# Patient Record
Sex: MALE | Race: WHITE | NOT HISPANIC OR LATINO | Employment: FULL TIME | URBAN - NONMETROPOLITAN AREA
[De-identification: names, ages, dates, MRNs, and addresses within clinical notes are randomized per-mention and may not be internally consistent; named-entity substitution may affect disease eponyms.]

---

## 2021-09-09 ENCOUNTER — HOSPITAL ENCOUNTER (EMERGENCY)
Facility: HOSPITAL | Age: 46
Discharge: HOME | End: 2021-09-09
Attending: EMERGENCY MEDICINE | Admitting: EMERGENCY MEDICINE

## 2021-09-09 ENCOUNTER — APPOINTMENT (OUTPATIENT)
Dept: GENERAL RADIOLOGY | Facility: HOSPITAL | Age: 46
End: 2021-09-09

## 2021-09-09 VITALS
BODY MASS INDEX: 31.75 KG/M2 | HEART RATE: 102 BPM | HEIGHT: 72 IN | RESPIRATION RATE: 18 BRPM | SYSTOLIC BLOOD PRESSURE: 147 MMHG | OXYGEN SATURATION: 98 % | DIASTOLIC BLOOD PRESSURE: 101 MMHG | WEIGHT: 234.4 LBS | TEMPERATURE: 98.3 F
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
## 2021-09-09 DIAGNOSIS — M25.512 ACUTE SHOULDER PAIN DUE TO TRAUMA, LEFT: Primary | ICD-10-CM

## 2021-09-09 DIAGNOSIS — G89.11 ACUTE SHOULDER PAIN DUE TO TRAUMA, LEFT: Primary | ICD-10-CM

## 2021-09-09 PROCEDURE — 99283 EMERGENCY DEPT VISIT LOW MDM: CPT

## 2021-09-09 PROCEDURE — 73030 X-RAY EXAM OF SHOULDER: CPT

## 2021-09-09 RX ORDER — CYCLOBENZAPRINE HCL 5 MG
5 TABLET ORAL 3 TIMES DAILY PRN
Qty: 12 TABLET | Refills: 0 | Status: SHIPPED | OUTPATIENT
Start: 2021-09-09

## 2021-09-09 RX ORDER — CYCLOBENZAPRINE HCL 10 MG
10 TABLET ORAL ONCE
Status: COMPLETED | OUTPATIENT
Start: 2021-09-09 | End: 2021-09-09

## 2021-09-09 RX ORDER — OMEPRAZOLE 40 MG/1
CAPSULE, DELAYED RELEASE ORAL DAILY
COMMUNITY

## 2021-09-09 RX ORDER — TAMSULOSIN HYDROCHLORIDE 0.4 MG/1
1 CAPSULE ORAL DAILY
COMMUNITY

## 2021-09-09 RX ADMIN — CYCLOBENZAPRINE HYDROCHLORIDE 10 MG: 10 TABLET, FILM COATED ORAL at 23:42

## 2021-09-10 NOTE — DISCHARGE INSTRUCTIONS
Take Tylenol and Motrin as needed per directions on package. Take Flexeril as prescribed as needed. Follow up with Orthopedics. Return to the ER for new/worsening symptoms.

## 2021-09-10 NOTE — ED PROVIDER NOTES
Subjective   History of Present Illness   Patient is a 45-year-old male presenting to the ER with symptoms of left shoulder pain after a fall today he tripped over steps on his porch and fell onto his left shoulder.  He denies head trauma and loss of consciousness.  He denies additional injuries or complaints at this time.  He states that the pain is worse when he is sitting down and he states he is having difficulty with range of motion.  He states he is concerned he will have difficulty working as he is left-handed and has to lift a lot at work.    Review of Systems   Musculoskeletal:        Left shoulder pain secondary to fall   All other systems reviewed and are negative.      Past Medical History:   Diagnosis Date   • GERD (gastroesophageal reflux disease)    • Hiatal hernia    • Prostate disease        Allergies   Allergen Reactions   • Phenergan [Promethazine Hcl] Seizure       History reviewed. No pertinent surgical history.    History reviewed. No pertinent family history.    Social History     Socioeconomic History   • Marital status:      Spouse name: Not on file   • Number of children: Not on file   • Years of education: Not on file   • Highest education level: Not on file           Objective   Physical Exam  Vitals and nursing note reviewed.   Constitutional:       General: He is not in acute distress.     Appearance: He is not toxic-appearing.   HENT:      Head: Normocephalic and atraumatic.      Right Ear: External ear normal.      Left Ear: External ear normal.      Nose: Nose normal.   Eyes:      Extraocular Movements: Extraocular movements intact.      Conjunctiva/sclera: Conjunctivae normal.   Cardiovascular:      Rate and Rhythm: Normal rate.      Heart sounds: Normal heart sounds. No murmur heard.   No friction rub. No gallop.    Pulmonary:      Effort: Pulmonary effort is normal.      Breath sounds: Normal breath sounds. No wheezing.   Abdominal:      Palpations: Abdomen is soft.       Tenderness: There is no abdominal tenderness.   Musculoskeletal:      Cervical back: Normal range of motion and neck supple.      Comments: Decreased range of motion of left upper extremity, neurovascularly intact, tenderness to palpation of the left shoulder   Skin:     General: Skin is warm and dry.   Neurological:      General: No focal deficit present.      Mental Status: He is alert and oriented to person, place, and time.   Psychiatric:         Mood and Affect: Mood normal.         Behavior: Behavior normal.         Procedures           ED Course                                           MDM   Patient had Excedrin and ibuprofen prior to arrival.  Vitals are within normal limits.  Exam remarkable for decreased range of motion and tenderness to palpation of left shoulder.  X-ray performed and reviewed by myself and ED attending.  No obvious fracture or dislocation.  Patient was given 10 mg p.o. Flexeril.  He was given a sling for immobilization.  Prescription for Flexeril given.  Follow-up information for orthopedics for definitive care was given.  Precautions were given for return to the ER for any new or worsening symptoms.    Final diagnoses:   Acute shoulder pain due to trauma, left   Fall, initial encounter       ED Disposition  ED Disposition     ED Disposition Condition Comment    Discharge Stable           Provider, No Known  [facility] [id number] [phone]    Call   As needed    [facility] Emergency Department  [address] [address] [phone]  Go to   As needed, If symptoms worsen    Harlan Bangura MD  [address]  [address]  [facility] [place] [address] [phone]    Schedule an appointment as soon as possible for a visit   for re-check of today's complaint         Medication List      New Prescriptions    cyclobenzaprine 5 MG tablet  Commonly known as: FLEXERIL  Take 1 tablet by mouth 3 (Three) Times a Day As Needed for Muscle  Spasms.           Where to Get Your Medications      These medications were sent to [facility] [address] [place] [address] - [address] [place] [address] - [phone]  - [phone] [address] [address]    Phone: [phone]   · cyclobenzaprine 5 MG tablet          Marjorie Hdez PA-C  09/09/21 [id number]

## 2022-01-28 ENCOUNTER — OFFICE VISIT (OUTPATIENT)
Dept: UROLOGY | Facility: CLINIC | Age: 47
End: 2022-01-28

## 2022-01-28 VITALS
RESPIRATION RATE: 16 BRPM | OXYGEN SATURATION: 97 % | WEIGHT: 234 LBS | SYSTOLIC BLOOD PRESSURE: 108 MMHG | HEIGHT: 72 IN | DIASTOLIC BLOOD PRESSURE: 62 MMHG | TEMPERATURE: 97.8 F | BODY MASS INDEX: 31.69 KG/M2 | HEART RATE: 86 BPM

## 2022-01-28 DIAGNOSIS — R39.9 LOWER URINARY TRACT SYMPTOMS (LUTS): ICD-10-CM

## 2022-01-28 DIAGNOSIS — N40.1 BENIGN PROSTATIC HYPERPLASIA WITH LOWER URINARY TRACT SYMPTOMS, SYMPTOM DETAILS UNSPECIFIED: Primary | ICD-10-CM

## 2022-01-28 LAB
BILIRUB BLD-MCNC: NEGATIVE MG/DL
CLARITY, POC: CLEAR
COLOR UR: YELLOW
EXPIRATION DATE: ABNORMAL
GLUCOSE UR STRIP-MCNC: NEGATIVE MG/DL
KETONES UR QL: NEGATIVE
LEUKOCYTE EST, POC: NEGATIVE
Lab: ABNORMAL
NITRITE UR-MCNC: NEGATIVE MG/ML
PH UR: 6.5 [PH] (ref 5–8)
PROT UR STRIP-MCNC: ABNORMAL MG/DL
RBC # UR STRIP: ABNORMAL /UL
SP GR UR: 1.01 (ref 1–1.03)
UROBILINOGEN UR QL: ABNORMAL

## 2022-01-28 PROCEDURE — 81003 URINALYSIS AUTO W/O SCOPE: CPT | Performed by: UROLOGY

## 2022-01-28 PROCEDURE — 99204 OFFICE O/P NEW MOD 45 MIN: CPT | Performed by: UROLOGY

## 2022-01-28 RX ORDER — ACETAMINOPHEN 500 MG/1
TABLET ORAL
COMMUNITY
Start: 2021-10-22

## 2022-01-28 RX ORDER — IBUPROFEN 800 MG/1
TABLET ORAL
COMMUNITY
Start: 2021-10-22

## 2022-01-28 NOTE — PROGRESS NOTES
"Chief Complaint  LUTS    Referring Provider  Kiley Tinoco, NP-C     HPI  Mr. Lopez is a 46 y.o. male with history below who presents with LUTS.  Primary symptom includes: Weak stream, nocturia, intermittency  Patient denies dysuria or hematuria.  Onset was 5 years ago.    Previous treatments include: flomax; has been doubling his dose because he is very bothered by Sx.   IPSS is 23.  Scanned in chart    Pt reports vein on right scrotum that bothers him    Past Medical History  Past Medical History:   Diagnosis Date   • GERD (gastroesophageal reflux disease)    • Hiatal hernia    • Prostate disease        Past Surgical History  History reviewed. No pertinent surgical history.    Medications    Current Outpatient Medications:   •  EQ Pain Reliever 500 MG tablet, , Disp: , Rfl:   •  ibuprofen (ADVIL,MOTRIN) 800 MG tablet, , Disp: , Rfl:   •  omeprazole (priLOSEC) 40 MG capsule, Take  by mouth Daily., Disp: , Rfl:   •  tamsulosin (FLOMAX) 0.4 MG capsule 24 hr capsule, Take 1 capsule by mouth Daily., Disp: , Rfl:   •  cyclobenzaprine (FLEXERIL) 5 MG tablet, Take 1 tablet by mouth 3 (Three) Times a Day As Needed for Muscle Spasms., Disp: 12 tablet, Rfl: 0    Allergies  Allergies   Allergen Reactions   • Phenergan [Promethazine Hcl] Seizure       Social History  Social History     Socioeconomic History   • Marital status:        Family History  He has no family history of prostate cancer  History reviewed. No pertinent family history.    Physical Exam  Visit Vitals  /62   Pulse 86   Temp 97.8 °F (36.6 °C)   Resp 16   Ht 182.9 cm (72\")   Wt 106 kg (234 lb)   SpO2 97%   BMI 31.74 kg/m²     Physical exam notable for normal habitus.    Genitourinary  Deferred    Labs  No results found for: PSA    Brief Urine Lab Results  (Last result in the past 365 days)      Color   Clarity   Blood   Leuk Est   Nitrite   Protein   CREAT   Urine HCG        01/28/22 1022 Yellow   Clear   Trace   Negative   Negative   Trace    "            PSA 1.0 10/15/21 at [facility]    PVR  Post-void residual performed by staff - 87 mL      Assessment  Mr. Lopez is a 46 y.o. male who presents with worsening of chronic LUTS, primarily weak stream, intermittency, despite alpha blocker.    Plan  1.  Follow up for cystoscopy, TRUS, Uroflow, ISATU, GE   2.  Continue tamsulosin for now.  I recommended against increasing it to twice daily    Tito Owens MD

## 2022-02-21 ENCOUNTER — OFFICE VISIT (OUTPATIENT)
Dept: UROLOGY | Facility: CLINIC | Age: 47
End: 2022-02-21

## 2022-02-21 DIAGNOSIS — R39.9 LOWER URINARY TRACT SYMPTOMS (LUTS): Primary | ICD-10-CM

## 2022-02-21 PROCEDURE — 51741 ELECTRO-UROFLOWMETRY FIRST: CPT | Performed by: UROLOGY

## 2022-02-21 PROCEDURE — 52000 CYSTOURETHROSCOPY: CPT | Performed by: UROLOGY

## 2022-02-21 PROCEDURE — 76872 US TRANSRECTAL: CPT | Performed by: UROLOGY

## 2022-02-21 RX ORDER — ACETAMINOPHEN 325 MG/1
650 TABLET ORAL EVERY 6 HOURS
Qty: 32 TABLET | Refills: 0 | Status: SHIPPED | OUTPATIENT
Start: 2022-02-21 | End: 2022-02-25

## 2022-02-21 RX ORDER — PHENAZOPYRIDINE HYDROCHLORIDE 100 MG/1
100 TABLET, FILM COATED ORAL 3 TIMES DAILY PRN
Qty: 30 TABLET | Refills: 0 | Status: SHIPPED | OUTPATIENT
Start: 2022-02-21 | End: 2022-02-24

## 2022-02-21 RX ORDER — SULFAMETHOXAZOLE AND TRIMETHOPRIM 800; 160 MG/1; MG/1
1 TABLET ORAL 2 TIMES DAILY
Qty: 6 TABLET | Refills: 0 | Status: SHIPPED | OUTPATIENT
Start: 2022-02-21

## 2022-02-21 NOTE — PROGRESS NOTES
Chief Complaint   Patient presents with   • Cystoscopy with TRUS and Uroflow     100mg Macrodantin given prior to procedure        HPI  Ms. Lopez is a 46 y.o. male with history below in assessment, who presents for follow up.     At this visit still bothered by weak stream and nocturia    Past Medical History:   Diagnosis Date   • GERD (gastroesophageal reflux disease)    • Hiatal hernia    • Prostate disease        No past surgical history on file.      Current Outpatient Medications:   •  cyclobenzaprine (FLEXERIL) 5 MG tablet, Take 1 tablet by mouth 3 (Three) Times a Day As Needed for Muscle Spasms., Disp: 12 tablet, Rfl: 0  •  EQ Pain Reliever 500 MG tablet, , Disp: , Rfl:   •  ibuprofen (ADVIL,MOTRIN) 800 MG tablet, , Disp: , Rfl:   •  omeprazole (priLOSEC) 40 MG capsule, Take  by mouth Daily., Disp: , Rfl:   •  tamsulosin (FLOMAX) 0.4 MG capsule 24 hr capsule, Take 1 capsule by mouth Daily., Disp: , Rfl:      Physical Exam  There were no vitals taken for this visit.    Labs  Brief Urine Lab Results  (Last result in the past 365 days)      Color   Clarity   Blood   Leuk Est   Nitrite   Protein   CREAT   Urine HCG        01/28/22 1022 Yellow   Clear   Trace   Negative   Negative   Trace                 No results found for: GLUCOSE, CALCIUM, NA, K, CO2, CL, BUN, CREATININE, EGFRIFAFRI, EGFRIFNONA, BCR, ANIONGAP    No results found for: WBC, HGB, HCT, MCV, PLT     No results found for: PSA        Radiographic Studies  No Images in the past 120 days found..    Preprocedure diagnosis  LUTS    Postprocedure diagnosis  LUTS    Procedure  Flexible Cystourethroscopy    Attending surgeon  Tito Owens MD    Anesthesia  2% lidocaine jelly intraurethrally    Complications  None    Indications  46 y.o. male undergoing a flexible cystoscopy for the above mentioned indications.  Informed consent was obtained.      Findings  Cystoscopic findings included one right and left ureteral orifice in the normal anatomic  position with normal bladder mucosa and no tumors, masses or stones. The urethral urothelium was within normal limits with no strictures.  There was not a prominent median lobe.  The lateral lobes were short and obstructive in appearance.      Procedure  The patient was placed in supine position and prepped and draped in sterile fashion with lidocaine jelly per urethra for anesthesia.  A timeout was performed.  The 14F flexible cystoscope was lubricated and gently placed through the penile urethra and into the bladder.  The bladder was completely visualized.  The cystoscope was retroflexed and the bladder neck and prostate visualized.  The cystoscope was slowly withdrawn while visualizing the urethra and the procedure terminated.  The patient tolerated the procedure well.      TRUS OF PROSTATE    Preoperative diagnosis  LUTS    Postoperative diagnosis  Same    Procedure  1.  Transrectal ultrasound of the prostate    Attending Surgeon  Tito Owens MD    Anesthesia  2% lidocaine jelly, intrarectal instillation, 10mL    Complications  None    Specimen  None    Indications  Mr. Lopez is a 46 y.o. male with LUTS.  He presents for prostate ultrasound to evaluate prostate size.    Procedure  The patient was positioned and prepped in a left lateral position with lower extremities flexed.  Lidocaine jelly, 2%, was injected per rectum. A digital rectal exam was performed which demonstrated a smooth prostate without nodules or induration. The [employer] E8CS rectal ultrasound probe was slowly introduced into the rectum without difficulty.  The prostate and seminal vesicles were inspected systematically using cross and sagittal views with the ultrasound. A median lobe was not seen.  The dimensions of the prostate were measured, for a calculated volume of 31 mL.  The seminal vesicles appeared normal.  The rectal ultrasound probe was removed.  The patient tolerated the procedure well.    Uroflow:  Peak flow rate - 10.1  mL/sec  Average flow rate - 4 mL/sec  Flow curve - low hump  Voided volume - 120 mL    I personally reviewed  and interpreted this study.     I have reviewed above labs and imaging.     Assessment  46 y.o. male with worsening of chronic LUTS, primarily weak stream, intermittency, despite alpha blocker.    In a separate room in separate encounter after his cystoscopy we discussed different treatment options for his chronic lower urinary tract symptoms.  He has already failed medications.  He desires the least invasive procedure possible.  We discussed the risk, benefits, and alternatives to UroLift.  Informed consent was obtained.    Plan  1. Schedule Urolift at [facility].   2. Rx sent in    I spent a total of 30 minutes with the patient and the chart engaging in data gathering and interpretation, patient interaction, as well as counseling on the risks, benefits, and alternatives of the therapy and coordinating care.

## 2022-03-16 ENCOUNTER — TELEPHONE (OUTPATIENT)
Dept: UROLOGY | Facility: CLINIC | Age: 47
End: 2022-03-16

## 2022-03-16 DIAGNOSIS — R39.9 LOWER URINARY TRACT SYMPTOMS (LUTS): Primary | ICD-10-CM

## 2022-03-16 RX ORDER — OXYBUTYNIN CHLORIDE 10 MG/1
10 TABLET, EXTENDED RELEASE ORAL DAILY
Qty: 30 TABLET | Refills: 0 | Status: SHIPPED | OUTPATIENT
Start: 2022-03-16

## 2022-03-16 NOTE — TELEPHONE ENCOUNTER
Please call and tell him that he is already on oxybutynin from my PA Ms. Aparicio, which does the same thing

## 2022-03-16 NOTE — TELEPHONE ENCOUNTER
Patient stated he was suppose to have a script for Myrbetriq, but when he went to the pharmacy to pick it up, he was told that they didn't have the script for the patient

## 2023-12-01 ENCOUNTER — OFFICE VISIT (OUTPATIENT)
Dept: GASTROENTEROLOGY | Facility: CLINIC | Age: 48
End: 2023-12-01
Payer: COMMERCIAL

## 2023-12-01 VITALS
SYSTOLIC BLOOD PRESSURE: 120 MMHG | HEART RATE: 92 BPM | WEIGHT: 247 LBS | BODY MASS INDEX: 33.5 KG/M2 | DIASTOLIC BLOOD PRESSURE: 80 MMHG | OXYGEN SATURATION: 98 %

## 2023-12-01 DIAGNOSIS — R13.10 DYSPHAGIA, UNSPECIFIED TYPE: Primary | ICD-10-CM

## 2023-12-01 DIAGNOSIS — R09.89 CHOKING SENSATION: ICD-10-CM

## 2023-12-01 DIAGNOSIS — K21.9 GASTROESOPHAGEAL REFLUX DISEASE, UNSPECIFIED WHETHER ESOPHAGITIS PRESENT: ICD-10-CM

## 2023-12-01 DIAGNOSIS — Z12.11 COLON CANCER SCREENING: ICD-10-CM

## 2023-12-01 DIAGNOSIS — K44.9 HIATAL HERNIA: ICD-10-CM

## 2023-12-01 RX ORDER — CYCLOBENZAPRINE HCL 5 MG
5 TABLET ORAL 3 TIMES DAILY PRN
COMMUNITY

## 2023-12-01 RX ORDER — LISINOPRIL 2.5 MG/1
2.5 TABLET ORAL DAILY
COMMUNITY

## 2023-12-01 RX ORDER — SODIUM, POTASSIUM,MAG SULFATES 17.5-3.13G
1 SOLUTION, RECONSTITUTED, ORAL ORAL EVERY 12 HOURS
Qty: 354 ML | Refills: 0 | Status: SHIPPED | OUTPATIENT
Start: 2023-12-01 | End: 2023-12-02

## 2023-12-01 NOTE — PROGRESS NOTES
New Patient Consult      Date: 2023   Patient Name: Dixon Lopez III  MRN: [id number]  : 1975     Referring Physician: Roxana Tavarez APRN    Chief Complaint   Patient presents with    Colon Cancer Screening    Difficulty Swallowing       History of Present Illness: Dixon Lopez III is a 48 y.o. male who is here today to establish care with Gastroenterology.    Complains of dysphagia, which is pharyngeal/high esophageal in nature.  There is also a globus sensation/unusual feeling in the throat, associated with choking sensation.  Sometimes he will have overt episodes of regurgitation as well.  Symptoms are worse when he lays on his left side, especially the cough/choking sensation.     He had an EGD and he was told that he had a HH and a growth in the esophagus, which was removed and was benign.     No prior colonoscopy.    There is a family history of multiple cancers.  No obvious esophageal cancer noted or colon cancer.      Subjective      Past Medical History:   Diagnosis Date    Back pain     GERD (gastroesophageal reflux disease)     Headache     Hiatal hernia     Prostate disease        Past Surgical History:   Procedure Laterality Date    PROSTATE SURGERY      MESH IMPLANT       History reviewed. No pertinent family history.    Social History     Socioeconomic History    Marital status:    Tobacco Use    Smoking status: Every Day     Types: Cigarettes   Vaping Use    Vaping Use: Never used   Substance and Sexual Activity    Alcohol use: Yes    Drug use: Never    Sexual activity: Defer         Current Outpatient Medications:     cyclobenzaprine (FLEXERIL) 5 MG tablet, Take 1 tablet by mouth 3 (Three) Times a Day As Needed for Muscle Spasms., Disp: , Rfl:     ibuprofen (ADVIL,MOTRIN) 600 MG tablet, Take 1 tablet by mouth As Needed for Mild Pain., Disp: , Rfl:     lisinopril (PRINIVIL,ZESTRIL) 2.5 MG tablet, Take 1 tablet by mouth Daily., Disp: , Rfl:     omeprazole (priLOSEC)  40 MG capsule, Take 1 capsule by mouth 2 (Two) Times a Day., Disp: , Rfl:     tamsulosin (FLOMAX) 0.4 MG capsule 24 hr capsule, Take 1 capsule by mouth 2 (Two) Times a Day., Disp: , Rfl:     sodium-potassium-magnesium sulfates (Suprep Bowel Prep Kit) 17.5-3.13-1.6 GM/177ML solution oral solution, Take 1 bottle by mouth Every 12 (Twelve) Hours for 1 day., Disp: 354 mL, Rfl: 0    Allergies   Allergen Reactions    Phenergan [Promethazine Hcl] Seizure       Review of Systems   Constitutional:  Negative for unexpected weight loss.   HENT:  Positive for trouble swallowing.    Gastrointestinal:  Negative for abdominal distention, abdominal pain, anal bleeding, blood in stool, constipation, diarrhea, nausea, rectal pain, vomiting, GERD and indigestion.       The following portions of the patient's history were reviewed and updated as appropriate: allergies, current medications, past family history, past medical history, past social history, past surgical history and problem list.    Objective     Physical Exam:  Vitals:    12/01/23 0942   BP: 120/80   Pulse: 92   SpO2: 98%   Weight: 112 kg (247 lb)       Physical Exam  Constitutional:       General: He is not in acute distress.     Appearance: Normal appearance. He is not ill-appearing.   HENT:      Head: Normocephalic and atraumatic.      Mouth/Throat:      Mouth: Mucous membranes are moist.   Eyes:      General: No scleral icterus.     Conjunctiva/sclera: Conjunctivae normal.   Cardiovascular:      Rate and Rhythm: Normal rate.      Heart sounds: Normal heart sounds.   Pulmonary:      Effort: Pulmonary effort is normal. No respiratory distress.      Breath sounds: Normal breath sounds.   Abdominal:      General: There is no distension.      Palpations: Abdomen is soft.      Tenderness: There is no abdominal tenderness. There is no guarding.   Musculoskeletal:         General: No swelling or tenderness.      Cervical back: Neck supple. No rigidity.   Skin:     General:  Skin is warm and dry.      Capillary Refill: Capillary refill takes less than 2 seconds.      Coloration: Skin is not jaundiced or pale.   Neurological:      General: No focal deficit present.      Mental Status: He is alert and oriented to person, place, and time. Mental status is at baseline.   Psychiatric:         Mood and Affect: Mood normal.         Behavior: Behavior normal.         Results Review:   I have reviewed the patient's new clinical and imaging results and agree with the interpretation.     No visits with results within 90 Day(s) from this visit.   Latest known visit with results is:   Office Visit on 01/28/2022   Component Date Value Ref Range Status    Color 01/28/2022 Yellow  Yellow, Straw, Dark Yellow, [place] Final    Clarity, UA 01/28/2022 Clear  Clear Final    Specific Gravity  01/28/2022 1.015  1.005 - 1.030 Final    pH, Urine 01/28/2022 6.5  5.0 - 8.0 Final    Leukocytes 01/28/2022 Negative  Negative Final    Nitrite, UA 01/28/2022 Negative  Negative Final    Protein, POC 01/28/2022 Trace (A)  Negative mg/dL Final    Glucose, UA 01/28/2022 Negative  Negative, 1000 mg/dL (3+) mg/dL Final    Ketones, UA 01/28/2022 Negative  Negative Final    Urobilinogen, UA 01/28/2022 12 E.U./dL (A)  Normal Final    Bilirubin 01/28/2022 Negative  Negative Final    Blood, UA 01/28/2022 Trace (A)  Negative Final    Lot Number 01/28/2022 [id number]   Final    Expiration Date 01/28/2022 03/18/2023   Final      No radiology results for the last 90 days.    Assessment / Plan      Assessment & Plan:  Diagnoses and all orders for this visit:    1. Dysphagia, unspecified type (Primary)  -     Case Request; Standing  -     Case Request    2. Gastroesophageal reflux disease, unspecified whether esophagitis present  -     Case Request; Standing  -     Case Request    3. Hiatal hernia  -     Case Request; Standing  -     Case Request    4. Choking sensation  -     Case Request; Standing  -     Case Request    5. Colon  cancer screening  -     Case Request; Standing  -     Case Request    Other orders  -     Follow Anesthesia Guidelines / Protocol; Standing  -     Follow Anesthesia Guidelines / Protocol; Future  -     Obtain Informed Consent; Future  -     Verify NPO; Standing  -     sodium-potassium-magnesium sulfates (Suprep Bowel Prep Kit) 17.5-3.13-1.6 GM/177ML solution oral solution; Take 1 bottle by mouth Every 12 (Twelve) Hours for 1 day.  Dispense: 354 mL; Refill: 0        Symptoms are consistent with esophageal dysphagia. Given that it is an alarm feature, we will schedule a prompt upper endoscopy for further evaluation. The differential diagnosis includes but is not limited to strictures or stenosis from chronic acid reflux, esophagitis, eosinophilic esophagitis, webs, rings, including the possibility of malignancy.     Need to obtain prior records for his last EGD.    Due for colorectal cancer screening.  No lower GI symptoms.    Suprep.    Plan for colonoscopy with monitored anesthesia care. Counseled in detail regarding the risks, benefits and alternatives of the procedure, including but not limited to perforation, bleeding, infection, post-operative pain, complications from anesthesia, aspiration, cardiac decompensation, need for further procedures or surgery, which may occur in approximately 1 in 1000 procedures. Counseled also that endoscopy and colonoscopy are not perfect tests, and there is a possibility of missed diagnoses, including but not limited to polyps or cancer. Counseled regarding pre procedural instructions. Also discussed bowel preparation. Counseled regarding potential, albeit rare complications with bowel preparation specific to this patients medical history and medications, including but not limited to renal insufficiency/failure, electrolyte abnormalities, which may lead to other complications. Hydration is encouraged. Written instructions provided. Instructed to arrange for a ride home for the day  of procedure. Patient is in agreement with the above plan and voices understanding of the plan as well as the associated risks and the alternative evaluation/treatment/medication options.         Follow Up:   Return for Follow-up TBD after testing is reviewed.    Antonio Aragon MD  [facility] [place]    12/1/2023  10:23 EST    Part of this note may be an electronic transcription/translation of spoken language to printed text using the Dragon Dictation System.

## 2023-12-13 ENCOUNTER — APPOINTMENT (OUTPATIENT)
Dept: GENERAL RADIOLOGY | Facility: HOSPITAL | Age: 48
End: 2023-12-13
Payer: COMMERCIAL

## 2023-12-13 ENCOUNTER — HOSPITAL ENCOUNTER (EMERGENCY)
Facility: HOSPITAL | Age: 48
Discharge: HOME | End: 2023-12-13
Attending: EMERGENCY MEDICINE
Payer: COMMERCIAL

## 2023-12-13 VITALS
SYSTOLIC BLOOD PRESSURE: 143 MMHG | HEIGHT: 72 IN | DIASTOLIC BLOOD PRESSURE: 103 MMHG | HEART RATE: 92 BPM | RESPIRATION RATE: 18 BRPM | OXYGEN SATURATION: 97 % | WEIGHT: 246.2 LBS | TEMPERATURE: 97.7 F | BODY MASS INDEX: 33.35 KG/M2

## 2023-12-13 DIAGNOSIS — J40 BRONCHITIS: Primary | ICD-10-CM

## 2023-12-13 LAB
FLUAV SUBTYP SPEC NAA+PROBE: NOT DETECTED
FLUBV RNA ISLT QL NAA+PROBE: NOT DETECTED
SARS-COV-2 RNA RESP QL NAA+PROBE: NOT DETECTED

## 2023-12-13 PROCEDURE — 71045 X-RAY EXAM CHEST 1 VIEW: CPT

## 2023-12-13 PROCEDURE — 25010000002 METHYLPREDNISOLONE PER 40 MG

## 2023-12-13 PROCEDURE — 99283 EMERGENCY DEPT VISIT LOW MDM: CPT

## 2023-12-13 PROCEDURE — 87636 SARSCOV2 & INF A&B AMP PRB: CPT

## 2023-12-13 PROCEDURE — 96372 THER/PROPH/DIAG INJ SC/IM: CPT

## 2023-12-13 RX ORDER — ALBUTEROL SULFATE 90 UG/1
2 AEROSOL, METERED RESPIRATORY (INHALATION) ONCE
Status: COMPLETED | OUTPATIENT
Start: 2023-12-13 | End: 2023-12-13

## 2023-12-13 RX ORDER — BENZONATATE 100 MG/1
100 CAPSULE ORAL 3 TIMES DAILY PRN
Qty: 30 CAPSULE | Refills: 0 | Status: SHIPPED | OUTPATIENT
Start: 2023-12-13 | End: 2023-12-22

## 2023-12-13 RX ORDER — AZITHROMYCIN 250 MG/1
TABLET, FILM COATED ORAL
Qty: 6 TABLET | Refills: 0 | Status: SHIPPED | OUTPATIENT
Start: 2023-12-13 | End: 2023-12-22

## 2023-12-13 RX ORDER — FLUTICASONE PROPIONATE 50 MCG
2 SPRAY, SUSPENSION (ML) NASAL DAILY
Qty: 9.9 ML | Refills: 0 | Status: SHIPPED | OUTPATIENT
Start: 2023-12-13 | End: 2023-12-20

## 2023-12-13 RX ORDER — METHYLPREDNISOLONE SODIUM SUCCINATE 40 MG/ML
80 INJECTION, POWDER, LYOPHILIZED, FOR SOLUTION INTRAMUSCULAR; INTRAVENOUS ONCE
Status: COMPLETED | OUTPATIENT
Start: 2023-12-13 | End: 2023-12-13

## 2023-12-13 RX ORDER — ALBUTEROL SULFATE 90 UG/1
2 AEROSOL, METERED RESPIRATORY (INHALATION) EVERY 4 HOURS PRN
Qty: 8 G | Refills: 0 | Status: SHIPPED | OUTPATIENT
Start: 2023-12-13

## 2023-12-13 RX ORDER — PREDNISONE 20 MG/1
20 TABLET ORAL 2 TIMES DAILY
Qty: 10 TABLET | Refills: 0 | Status: SHIPPED | OUTPATIENT
Start: 2023-12-13 | End: 2023-12-18

## 2023-12-13 RX ADMIN — METHYLPREDNISOLONE SODIUM SUCCINATE 80 MG: 40 INJECTION, POWDER, FOR SOLUTION INTRAMUSCULAR; INTRAVENOUS at 11:16

## 2023-12-13 RX ADMIN — ALBUTEROL SULFATE 2 PUFF: 90 AEROSOL, METERED RESPIRATORY (INHALATION) at 11:14

## 2023-12-13 NOTE — ED PROVIDER NOTES
Subjective  History of Present Illness:    This is a 48-year-old male, history of back pain GERD headache prostate disease presenting emergency room today for evaluation of cough.  Cough been ongoing for 5 days.  He is a every day cigarette smoker.  No known fevers.  He is afebrile hemodynamically stable on arrival with appropriate oxygen saturations.  He does not wear oxygen at home.  No known sick contacts.  Cough is nonproductive.  He additionally describes nasal congestion, cough and nasal congestion make it more difficult for him to breathe when he is laying down but denies any shortness of air when sitting upright.  He denies any chest pain.  He called his primary care who he try to get in with today but was unable to and thought he may have pneumonia and sent him to the ER for a chest x-ray.      Nurses Notes reviewed and agree, including vitals, allergies, social history and prior medical history.     REVIEW OF SYSTEMS: All systems reviewed and not pertinent unless noted.  Review of Systems   Constitutional:  Negative for chills and fever.   HENT:  Positive for congestion.    Respiratory:  Positive for cough. Negative for shortness of breath.    Cardiovascular:  Negative for chest pain.   All other systems reviewed and are negative.      Past Medical History:   Diagnosis Date    Back pain     GERD (gastroesophageal reflux disease)     Headache     Hiatal hernia     Prostate disease        Allergies:    Phenergan [promethazine hcl]      Past Surgical History:   Procedure Laterality Date    PROSTATE SURGERY      MESH IMPLANT         Social History     Socioeconomic History    Marital status:    Tobacco Use    Smoking status: Every Day     Types: Cigarettes   Vaping Use    Vaping Use: Never used   Substance and Sexual Activity    Alcohol use: Yes    Drug use: Never    Sexual activity: Defer         History reviewed. No pertinent family history.    Objective  Physical Exam:  BP (!) 143/103 (BP Location:  "Left arm, Patient Position: Sitting)   Pulse 92   Temp 97.7 °F (36.5 °C) (Oral)   Resp 18   Ht 182.9 cm (72\")   Wt 112 kg (246 lb 3.2 oz)   SpO2 97%   BMI 33.39 kg/m²      Physical Exam  Vitals and nursing note reviewed.   Constitutional:       General: He is not in acute distress.     Appearance: Normal appearance. He is normal weight. He is not ill-appearing, toxic-appearing or diaphoretic.   HENT:      Head: Normocephalic and atraumatic.      Nose: Congestion present.      Mouth/Throat:      Pharynx: Oropharynx is clear.   Eyes:      Extraocular Movements: Extraocular movements intact.   Cardiovascular:      Rate and Rhythm: Normal rate and regular rhythm.      Pulses: Normal pulses.      Heart sounds: Normal heart sounds.   Pulmonary:      Effort: Pulmonary effort is normal.      Breath sounds: Wheezing present.   Abdominal:      General: Abdomen is flat. There is no distension.      Tenderness: There is no abdominal tenderness. There is no guarding.   Musculoskeletal:         General: Normal range of motion.      Cervical back: Normal range of motion.   Skin:     General: Skin is warm and dry.      Capillary Refill: Capillary refill takes less than 2 seconds.   Neurological:      General: No focal deficit present.      Mental Status: He is alert and oriented to person, place, and time.   Psychiatric:         Mood and Affect: Mood normal.         Behavior: Behavior normal.         Thought Content: Thought content normal.         Judgment: Judgment normal.             Procedures    ED Course:         Lab Results (last 24 hours)       Procedure Component Value Units Date/Time    COVID-19 and FLU A/B PCR, 1 HR TAT - Swab, Nasopharynx [[id number]]  (Normal) Collected: 12/13/23 1105    Specimen: Swab from Nasopharynx Updated: 12/13/23 1209     COVID19 Not Detected     Influenza A PCR Not Detected     Influenza B PCR Not Detected    Narrative:      Fact sheet for providers: " https://www.fda.gov/media/458364/download    Fact sheet for patients: https://www.fda.gov/media/142658/download    Test performed by PCR.             XR Chest 1 View    Result Date: 12/13/2023  PROCEDURE: XR CHEST 1 VW-    HISTORY: Cough, rule out pneumonia hx of smoking  COMPARISON: September 2021.  FINDINGS: The heart is normal in size. The mediastinum is unremarkable. There is bronchial wall thickening. There is no acute infiltrate. There is no pneumothorax. There are no acute osseous abnormalities.      Impression: Bronchitis without pneumonia.        Images were reviewed, interpreted, and dictated by Dr. Rosibel Nails MD Transcribed by Jessica Fisher PA-C.  This report was signed and finalized on 12/13/2023 11:58 AM by Rosibel Nails MD.          MDM      Initial impression of presenting illness: Is a 48-year-old male history of cigarette smoking every day presents emergency room today for evaluation of cough for 5 days that is dry nonproductive.  Called his primary care who thinks he may have pneumonia and sent him here to the emergency room for chest x-ray.    DDX: includes but is not limited to: Pneumonia, pneumothorax, viral upper respiratory tract infection, COPD exacerbation, chronic bronchitis, emphysema, others    Patient arrives hemodynamically stable afebrile nontachycardic nonhypoxic nontoxic-appearing with vitals interpreted by myself.     Pertinent features from physical exam: Nontoxic-appearing 48-year-old male.  2+ radial pulses bilaterally.  Lungs had wheezing mild throughout, good air movement bilaterally, cardiac oscitation with regular rate and rhythm.  Oropharynx clear, he does have nasal congestion..    Initial diagnostic plan: Chest x-ray, COVID flu testing    Results from initial plan were reviewed and interpreted by me revealing chest x-ray per radiology interpretation with bronchitis without pneumonia.  I personally reviewed this plain film and concur with their interpretation.  COVID  flu negative.    Diagnostic information from other sources: Old record reviewed    Interventions / Re-evaluation: Albuterol sulfate inhaler 2 puffs and Solu-Medrol injection.  Stable for discharge.    Results/clinical rationale were discussed with patient at bedside suspect that he has bronchitis.    Consultations/Discussion of results with other physicians: n/a/.     Disposition plan: Discharge.  Will send Z-David given his smoking history, prednisone, Tessalon Perles and Flonase for symptomatic relief.  Did discuss with the patient that given his everyday history of smoking and his age that he likely has a component of chronic bronchitis and recommended pulmonology follow-up, patient told me that his primary care has set him up for this type of follow-up here soon.  He was understanding of return precautions and agreeable to plan at bedside.  -----    Final diagnoses:   Bronchitis          Casey Lomeli PA-C  12/13/23 1214

## 2023-12-13 NOTE — DISCHARGE INSTRUCTIONS
Return the emergency room for any worsening symptoms.  Follow-up with your primary care physician.  I sent some medications to your pharmacy, take these as directed.

## 2023-12-13 NOTE — Clinical Note
[facility] EMERGENCY DEPARTMENT  [address] [address]  Phone: [phone]    Dixon Lopez was seen and treated in our emergency department on 12/13/2023.  He may return to work on 12/18/2023.         Thank you for choosing [facility].    Casey Lomeli PA-C

## 2023-12-18 ENCOUNTER — TRANSCRIBE ORDERS (OUTPATIENT)
Dept: ADMINISTRATIVE | Facility: HOSPITAL | Age: 48
End: 2023-12-18
Payer: COMMERCIAL

## 2023-12-18 DIAGNOSIS — Z72.0 TOBACCO ABUSE: Primary | ICD-10-CM

## 2023-12-28 ENCOUNTER — TELEPHONE (OUTPATIENT)
Dept: GASTROENTEROLOGY | Facility: CLINIC | Age: 48
End: 2023-12-28
Payer: COMMERCIAL

## 2023-12-28 NOTE — TELEPHONE ENCOUNTER
The patient called the [facility] requesting that his procedure on 01/02/2024 be cancelled - states he has some other things going on.  He did not wish to R/S at this time.